# Patient Record
Sex: FEMALE | Race: WHITE | Employment: UNEMPLOYED | URBAN - METROPOLITAN AREA
[De-identification: names, ages, dates, MRNs, and addresses within clinical notes are randomized per-mention and may not be internally consistent; named-entity substitution may affect disease eponyms.]

---

## 2019-04-09 ENCOUNTER — HOSPITAL ENCOUNTER (OUTPATIENT)
Dept: LAB | Age: 34
Discharge: HOME | End: 2019-04-09
Payer: OTHER GOVERNMENT

## 2019-04-09 ENCOUNTER — OFFICE VISIT (OUTPATIENT)
Dept: FAMILY MEDICINE CLINIC | Age: 34
End: 2019-04-09

## 2019-04-09 VITALS
BODY MASS INDEX: 21.27 KG/M2 | HEART RATE: 61 BPM | TEMPERATURE: 98.7 F | DIASTOLIC BLOOD PRESSURE: 72 MMHG | WEIGHT: 124.6 LBS | SYSTOLIC BLOOD PRESSURE: 112 MMHG | OXYGEN SATURATION: 98 % | HEIGHT: 64 IN | RESPIRATION RATE: 16 BRPM

## 2019-04-09 DIAGNOSIS — Z31.9 INFERTILITY MANAGEMENT: ICD-10-CM

## 2019-04-09 DIAGNOSIS — Z00.00 ROUTINE GENERAL MEDICAL EXAMINATION AT A HEALTH CARE FACILITY: ICD-10-CM

## 2019-04-09 DIAGNOSIS — E03.9 ACQUIRED HYPOTHYROIDISM: Primary | ICD-10-CM

## 2019-04-09 DIAGNOSIS — E03.9 ACQUIRED HYPOTHYROIDISM: ICD-10-CM

## 2019-04-09 LAB
T4 FREE SERPL-MCNC: 0.8 NG/DL (ref 0.7–1.5)
TSH SERPL DL<=0.05 MIU/L-ACNC: 1.3 UIU/ML (ref 0.36–3.74)

## 2019-04-09 PROCEDURE — 80048 BASIC METABOLIC PNL TOTAL CA: CPT

## 2019-04-09 PROCEDURE — 86376 MICROSOMAL ANTIBODY EACH: CPT

## 2019-04-09 PROCEDURE — 84439 ASSAY OF FREE THYROXINE: CPT

## 2019-04-09 PROCEDURE — 36415 COLL VENOUS BLD VENIPUNCTURE: CPT

## 2019-04-09 PROCEDURE — 84443 ASSAY THYROID STIM HORMONE: CPT

## 2019-04-09 RX ORDER — LEVOTHYROXINE SODIUM 25 UG/1
TABLET ORAL
COMMUNITY
End: 2019-07-03 | Stop reason: ALTCHOICE

## 2019-04-09 NOTE — PATIENT INSTRUCTIONS
Hypothyroidism: Care Instructions  Your Care Instructions    You have hypothyroidism, which means that your body is not making enough thyroid hormone. This hormone helps your body use energy. If your thyroid level is low, you may feel tired, be constipated, have an increase in your blood pressure, or have dry skin or memory problems. You may also get cold easily, even when it is warm. Women with low thyroid levels may have heavy menstrual periods. A blood test to find your thyroid-stimulating hormone (TSH) level is used to check for hypothyroidism. A high TSH level may mean that you have low thyroid. When your body is not making enough thyroid hormone, TSH levels rise in an effort to make the body produce more. The treatment for hypothyroidism is to take thyroid hormone pills. You should start to feel better in 1 to 2 weeks. But it can take several months to see changes in the TSH level. You will need regular visits with your doctor to make sure you have the right dose of medicine. Most people need treatment for the rest of their lives. You will need to see your doctor regularly to have blood tests and to make sure you are doing well. Follow-up care is a key part of your treatment and safety. Be sure to make and go to all appointments, and call your doctor if you are having problems. It's also a good idea to know your test results and keep a list of the medicines you take. How can you care for yourself at home? · Take your thyroid hormone medicine exactly as prescribed. Call your doctor if you think you are having a problem with your medicine. Most people do not have side effects if they take the right amount of medicine regularly. ? Take the medicine 30 minutes before breakfast, and do not take it with calcium, vitamins, or iron. ? Do not take extra doses of your thyroid medicine. It will not help you get better any faster, and it may cause side effects.   ? If you forget to take a dose, do NOT take a double dose of medicine. Take your usual dose the next day. · Tell your doctor about all prescription, herbal, or over-the-counter products you take. · Take care of yourself. Eat a healthy diet, get enough sleep, and get regular exercise. When should you call for help? Call 911 anytime you think you may need emergency care. For example, call if:    · You passed out (lost consciousness).     · You have severe trouble breathing.     · You have a very slow heartbeat (less than 60 beats a minute).     · You have a low body temperature (95°F or below).    Call your doctor now or seek immediate medical care if:    · You feel tired, sluggish, or weak.     · You have trouble remembering things or concentrating.     · You do not begin to feel better 2 weeks after starting your medicine.    Watch closely for changes in your health, and be sure to contact your doctor if you have any problems. Where can you learn more? Go to http://taylor-sebas.info/. Enter M878 in the search box to learn more about \"Hypothyroidism: Care Instructions. \"  Current as of: March 14, 2018  Content Version: 11.9  © 0647-3582 [employer]. Care instructions adapted under license by [employer] (which disclaims liability or warranty for this information). If you have questions about a medical condition or this instruction, always ask your healthcare professional. [address] any warranty or liability for your use of this information.

## 2019-04-09 NOTE — PROGRESS NOTES
Mookie Sheriff is a 37 y.o. female (: 1985) presenting to address:    Chief Complaint   Patient presents with   [facility].     dx'd with hypothyroid while pregnant; has not seen PCP in 10 years       Vitals:    19 1427   BP: 112/72   Pulse: 61   Resp: 16   Temp: 98.7 °F (37.1 °C)   TempSrc: Oral   SpO2: 98%   Weight: 124 lb 9.6 oz (56.5 kg)   Height: 5' 4.37\" (1.635 m)   PainSc:   0 - No pain       Hearing/Vision:   No exam data present    Learning Assessment:     Learning Assessment 2019   PRIMARY LEARNER Patient   HIGHEST LEVEL OF EDUCATION - PRIMARY LEARNER  > 4 YEARS OF COLLEGE   BARRIERS PRIMARY LEARNER NONE   CO-LEARNER CAREGIVER No   PRIMARY LANGUAGE ENGLISH    NEED No   LEARNER PREFERENCE PRIMARY OTHER (COMMENT)   ANSWERED BY patient   RELATIONSHIP SELF     Depression Screening:     3 most recent PHQ Screens 2019   Little interest or pleasure in doing things Not at all   Feeling down, depressed, irritable, or hopeless Not at all   Total Score PHQ 2 0     Fall Risk Assessment:   No flowsheet data found. Abuse Screening:   No flowsheet data found. Coordination of Care Questionaire:   1. Have you been to the ER, urgent care clinic since your last visit? Hospitalized since your last visit? NO    2. Have you seen or consulted any other health care providers outside of the [address] [place] [address] since your last visit? Include any pap smears or colon screening. YES; ophthamology and endocrine    Advanced Directive:   1. Do you have an Advanced Directive? NO    2. Would you like information on Advanced Directives?  NO

## 2019-04-09 NOTE — PROGRESS NOTES
Subjective:      Patient : Radha Darnell is an 37 y.o.  female being seen today for follow up of hypothyroidism. The  patient is currently asymptomatic. Off meds since 2016. Wanting another child wondering if fertility concerns are linked to thyroid. She has been trying for 3-4 months and has not been tracking ovulation until this month. Cycles are regular, she states difficulty with first pregnancy but did achieve naturally. 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 Current Outpatient Medications   Medication Sig Dispense Refill    prenatal vits15/iron/folic/dss (PRENATAL AD PO) Take  by mouth.  calcium-cholecalciferol, d3, (CALCIUM 600 + D) 600-125 mg-unit tab Take  by mouth.  omega-3 fatty acids (FISH OIL CONCENTRATE PO) Take  by mouth as needed.  levothyroxine (SYNTHROID) 25 mcg tablet Take  by mouth Daily (before breakfast).        No Known Allergies  Social History     Socioeconomic History    Marital status:      Spouse name: Not on file    Number of children: Not on file    Years of education: Not on file    Highest education level: Not on file   Occupational History    Not on file   Social Needs    Financial resource strain: Not on file    Food insecurity:     Worry: Not on file     Inability: Not on file    Transportation needs:     Medical: Not on file     Non-medical: Not on file   Tobacco Use    Smoking status: Never Smoker    Smokeless tobacco: Never Used   Substance and Sexual Activity    Alcohol use: Yes     Alcohol/week: 0.6 oz     Types: 1 Glasses of wine per week     Frequency: Monthly or less     Drinks per session: 1 or 2     Binge frequency: Never    Drug use: Never    Sexual activity: Yes     Partners: Male     Birth control/protection: None   Lifestyle    Physical activity:     Days per week: Not on file     Minutes per session: Not on file    Stress: Not on file   Relationships    Social connections:     Talks on phone: Not on file     Gets together: Not on file     Attends Mosque service: Not on file     Active member of club or organization: Not on file     Attends meetings of clubs or organizations: Not on file     Relationship status: Not on file    Intimate partner violence:     Fear of current or ex partner: Not on file     Emotionally abused: Not on file     Physically abused: Not on file     Forced sexual activity: Not on file   Other Topics Concern    Not on file   Social History Narrative    Not on file       Assessment:     Hypothyroidism  Well visit    Plan:     Orders Placed This Encounter    TSH 3RD GENERATION     Standing Status:   Future     Standing Expiration Date:   4/9/2020    T4, FREE     Standing Status:   Future     Standing Expiration Date:   4/9/2020    CBC W/O DIFF     Standing Status:   Future     Standing Expiration Date:   0/4/1040    METABOLIC PANEL, BASIC     Standing Status:   Future     Standing Expiration Date:   4/9/2020    prenatal vits15/iron/folic/dss (PRENATAL AD PO)     Sig: Take  by mouth.  levothyroxine (SYNTHROID) 25 mcg tablet     Sig: Take  by mouth Daily (before breakfast).     calcium-cholecalciferol, d3, (CALCIUM 600 + D) 600-125 mg-unit tab     Sig: Take  by mouth.  omega-3 fatty acids (FISH OIL CONCENTRATE PO)     Sig: Take  by mouth as needed. Will call with lab results and any adjustments. Lengthy discussion on fertility associated with thyroid. Will continue to address once labs are back. She will also give it more time and continue to track ovulation. Good news is she has achieved pregnancy before with same partner. Also discussed this as a positive finding, reassurance provided very anxious about becoming pregnant before  deploys. See back in 3 months.

## 2019-04-10 LAB
ANION GAP SERPL CALC-SCNC: 11 MMOL/L (ref 3–18)
BUN SERPL-MCNC: 18 MG/DL (ref 7–18)
BUN/CREAT SERPL: 23 (ref 12–20)
CALCIUM SERPL-MCNC: 8.9 MG/DL (ref 8.5–10.1)
CHLORIDE SERPL-SCNC: 106 MMOL/L (ref 100–108)
CO2 SERPL-SCNC: 22 MMOL/L (ref 21–32)
CREAT SERPL-MCNC: 0.77 MG/DL (ref 0.6–1.3)
GLUCOSE SERPL-MCNC: 85 MG/DL (ref 74–99)
POTASSIUM SERPL-SCNC: 4.5 MMOL/L (ref 3.5–5.5)
SODIUM SERPL-SCNC: 139 MMOL/L (ref 136–145)

## 2019-04-11 LAB — THYROPEROXIDASE AB SERPL-ACNC: 21 IU/ML (ref 0–34)

## 2019-04-11 NOTE — PROGRESS NOTES
Thyroid levels look great not a factor at this time levels are very appropriate not to high or low. No med at this time continue plan discussed in OV and see back for additional testing as needed in 2 months.

## 2019-04-12 ENCOUNTER — TELEPHONE (OUTPATIENT)
Dept: FAMILY MEDICINE CLINIC | Age: 34
End: 2019-04-12

## 2019-04-12 NOTE — TELEPHONE ENCOUNTER
Further explanation of TPO antibody and tsh, t4 test. Patient satisfied with information and will rtc in 2-3 months if continued concern.

## 2019-05-22 ENCOUNTER — TELEPHONE (OUTPATIENT)
Dept: FAMILY MEDICINE CLINIC | Age: 34
End: 2019-05-22

## 2019-05-22 DIAGNOSIS — Z31.41 FERTILITY TESTING: Primary | ICD-10-CM

## 2019-05-22 NOTE — TELEPHONE ENCOUNTER
Patient called and stated she would like to have the blood work that was discussed at prior visit done before her appointment on Sonal 10 th. Patient stated her cycle is irregular and she is unable to get pregnant  She has been having her cycle every 3 weeks for the past 5 months.

## 2019-05-24 NOTE — TELEPHONE ENCOUNTER
These tests must be done on day 3 after cycle starts. So she should come in on day 3 of menses and we can call her with results.

## 2019-05-29 NOTE — TELEPHONE ENCOUNTER
Patient can transfer care to GYN at any time if she would like more done then we have accomplished. However all labs must be done during cycle to check levels.

## 2019-05-29 NOTE — TELEPHONE ENCOUNTER
Patient called and was very upset because she feels like her time is being wasted. She stated that she called on day one of her cycle and that there was plenty of time to come in on day 3 of her cycle. The patient continued to go in circles with the fact that she called on day 1 of her cycle, and that it was plenty of time for her to come in on day 3 of her cycle to get the labs done. The patient kept stating that she could not help that there was a family emergency for [facility] and that another doctor should have stepped in to take care of this for her. It is unclear if the patient actually stated that she was on the first day of her cycle or not because in the original telephone encounter it stated that she wanted to get her labs done before her appointment on the 10th. At every attempt to explain to the patient that [facility] has put the orders in and that she needs to come in on her 3rd cycle day, the patient continued to cry and make the same statement, and say that we are wasting her time. The patient finally stated that same things as mentioned above, and ended the call.

## 2019-06-10 ENCOUNTER — OFFICE VISIT (OUTPATIENT)
Dept: FAMILY MEDICINE CLINIC | Age: 34
End: 2019-06-10

## 2019-06-10 VITALS
WEIGHT: 124.8 LBS | BODY MASS INDEX: 21.31 KG/M2 | SYSTOLIC BLOOD PRESSURE: 106 MMHG | OXYGEN SATURATION: 99 % | HEIGHT: 64 IN | TEMPERATURE: 97.1 F | HEART RATE: 70 BPM | DIASTOLIC BLOOD PRESSURE: 64 MMHG | RESPIRATION RATE: 18 BRPM

## 2019-06-10 DIAGNOSIS — Z31.41 FERTILITY TESTING: ICD-10-CM

## 2019-06-10 DIAGNOSIS — Z00.00 ROUTINE GENERAL MEDICAL EXAMINATION AT A HEALTH CARE FACILITY: Primary | ICD-10-CM

## 2019-06-10 RX ORDER — CHOLECALCIFEROL (VITAMIN D3) 125 MCG
5000 CAPSULE ORAL DAILY
COMMUNITY
End: 2019-07-03 | Stop reason: DRUGHIGH

## 2019-06-10 NOTE — PATIENT INSTRUCTIONS

## 2019-06-10 NOTE — PROGRESS NOTES
Ben Campbell is a 37 y.o. female (: 1985) presenting to address:    Chief Complaint   Patient presents with   [address]     follow up       Vitals:    06/10/19 0807   BP: 106/64   Pulse: 70   Resp: 18   Temp: 97.1 °F (36.2 °C)   TempSrc: Oral   SpO2: 99%   Weight: 124 lb 12.8 oz (56.6 kg)   Height: 5' 4.37\" (1.635 m)   PainSc:   0 - No pain   LMP: 2019       Hearing/Vision:   No exam data present    Learning Assessment:     Learning Assessment 2019   PRIMARY LEARNER Patient   HIGHEST LEVEL OF EDUCATION - PRIMARY LEARNER  > 4 YEARS OF COLLEGE   BARRIERS PRIMARY LEARNER NONE   CO-LEARNER CAREGIVER No   PRIMARY LANGUAGE ENGLISH    NEED No   LEARNER PREFERENCE PRIMARY OTHER (COMMENT)   ANSWERED BY patient   RELATIONSHIP SELF     Depression Screening:     3 most recent PHQ Screens 6/10/2019   Little interest or pleasure in doing things Not at all   Feeling down, depressed, irritable, or hopeless Not at all   Total Score PHQ 2 0     Fall Risk Assessment:   No flowsheet data found. Abuse Screening:   No flowsheet data found. Coordination of Care Questionaire:   1. Have you been to the ER, urgent care clinic since your last visit? Hospitalized since your last visit? NO    2. Have you seen or consulted any other health care providers outside of the [address] [place] [address] since your last visit? Include any pap smears or colon screening. No    Advanced Directive:   1. Do you have an Advanced Directive? NO    2. Would you like information on Advanced Directives?  NO

## 2019-06-10 NOTE — PROGRESS NOTES
Subjective:   37 y.o. female for Well Woman Check. Her gyne and breast care is done elsewhere by her Ob-Gyne physician. Current Outpatient Medications   Medication Sig Dispense Refill    cholecalciferol (VITAMIN D3) 5,000 unit capsule Take 5,000 Units by mouth daily.  prenatal vits15/iron/folic/dss (PRENATAL AD PO) Take  by mouth.  calcium-cholecalciferol, d3, (CALCIUM 600 + D) 600-125 mg-unit tab Take  by mouth.  omega-3 fatty acids (FISH OIL CONCENTRATE PO) Take  by mouth as needed.  levothyroxine (SYNTHROID) 25 mcg tablet Take  by mouth Daily (before breakfast). No Known Allergies     Lab Results   Component Value Date/Time    TSH 1.30 04/09/2019 02:43 PM    T4, Free 0.8 04/09/2019 02:43 PM         Specific concerns today: continued questions about fertility. Review of Systems  A comprehensive review of systems was negative except for that written in the HPI. Objective:   Blood pressure 106/64, pulse 70, temperature 97.1 °F (36.2 °C), temperature source Oral, resp. rate 18, height 5' 4.37\" (1.635 m), weight 124 lb 12.8 oz (56.6 kg), last menstrual period 05/20/2019, SpO2 99 %.    Physical Examination:   General appearance - alert, well appearing, and in no distress  Mental status - alert, oriented to person, place, and time, normal mood, behavior, speech, dress, motor activity, and thought processes  Eyes - pupils equal and reactive, extraocular eye movements intact  Ears - bilateral TM's and external ear canals normal  Nose - normal and patent, no erythema, discharge or polyps  Mouth - mucous membranes moist, pharynx normal without lesions  Neck - supple, no significant adenopathy, thyroid exam: thyroid is normal in size without nodules or tenderness  Lymphatics - no palpable lymphadenopathy, no hepatosplenomegaly  Chest - clear to auscultation, no wheezes, rales or rhonchi, symmetric air entry  Heart - normal rate, regular rhythm, normal S1, S2, no murmurs, rubs, clicks or gallops  Abdomen - soft, nontender, nondistended, no masses or organomegaly  Neurological - alert, oriented, normal speech, no focal findings or movement disorder noted  Musculoskeletal - no joint tenderness, deformity or swelling  Extremities - peripheral pulses normal, no pedal edema, no clubbing or cyanosis  Skin - normal coloration and turgor, no rashes, no suspicious skin lesions noted     Assessment/Plan: For fertility need to await testing she will have labs done on 3rd day of cycle. continue present diet with no restrictions, continue present plan, routine labs ordered, call if any problems  Consider referral to fertility specialist dependent on patient preference and labs. Encounter Diagnoses   Name Primary?     Routine general medical examination at a health care facility Yes    Fertility testing      Orders Placed This Encounter    cholecalciferol (VITAMIN D3) 5,000 unit capsule

## 2019-06-19 ENCOUNTER — HOSPITAL ENCOUNTER (OUTPATIENT)
Dept: LAB | Age: 34
Discharge: HOME | End: 2019-06-19
Payer: OTHER GOVERNMENT

## 2019-06-19 DIAGNOSIS — E03.9 ACQUIRED HYPOTHYROIDISM: ICD-10-CM

## 2019-06-19 DIAGNOSIS — Z31.41 FERTILITY TESTING: ICD-10-CM

## 2019-06-19 DIAGNOSIS — Z00.00 ROUTINE GENERAL MEDICAL EXAMINATION AT A HEALTH CARE FACILITY: ICD-10-CM

## 2019-06-19 LAB
ANION GAP SERPL CALC-SCNC: 8 MMOL/L (ref 3–18)
BUN SERPL-MCNC: 22 MG/DL (ref 7–18)
BUN/CREAT SERPL: 27 (ref 12–20)
CALCIUM SERPL-MCNC: 9.2 MG/DL (ref 8.5–10.1)
CHLORIDE SERPL-SCNC: 104 MMOL/L (ref 100–108)
CO2 SERPL-SCNC: 27 MMOL/L (ref 21–32)
CREAT SERPL-MCNC: 0.81 MG/DL (ref 0.6–1.3)
ERYTHROCYTE [DISTWIDTH] IN BLOOD BY AUTOMATED COUNT: 13 % (ref 11.6–14.5)
GLUCOSE SERPL-MCNC: 91 MG/DL (ref 74–99)
HCT VFR BLD AUTO: 46.6 % (ref 35–45)
HGB BLD-MCNC: 15 G/DL (ref 12–16)
MCH RBC QN AUTO: 32.4 PG (ref 24–34)
MCHC RBC AUTO-ENTMCNC: 32.2 G/DL (ref 31–37)
MCV RBC AUTO: 100.6 FL (ref 74–97)
PLATELET # BLD AUTO: 267 K/UL (ref 135–420)
PMV BLD AUTO: 10.6 FL (ref 9.2–11.8)
POTASSIUM SERPL-SCNC: 4.8 MMOL/L (ref 3.5–5.5)
RBC # BLD AUTO: 4.63 M/UL (ref 4.2–5.3)
SODIUM SERPL-SCNC: 139 MMOL/L (ref 136–145)
WBC # BLD AUTO: 5.6 K/UL (ref 4.6–13.2)

## 2019-06-19 PROCEDURE — 85027 COMPLETE CBC AUTOMATED: CPT

## 2019-06-19 PROCEDURE — 86376 MICROSOMAL ANTIBODY EACH: CPT

## 2019-06-19 PROCEDURE — 82670 ASSAY OF TOTAL ESTRADIOL: CPT

## 2019-06-19 PROCEDURE — 80048 BASIC METABOLIC PNL TOTAL CA: CPT

## 2019-06-19 PROCEDURE — 83001 ASSAY OF GONADOTROPIN (FSH): CPT

## 2019-06-19 PROCEDURE — 36415 COLL VENOUS BLD VENIPUNCTURE: CPT

## 2019-06-20 LAB
FSH SERPL-ACNC: 5.3 MIU/ML
LH SERPL-ACNC: 6.4 MIU/ML

## 2019-06-21 LAB
ESTRADIOL SERPL-MCNC: 44.9 PG/ML
THYROPEROXIDASE AB SERPL-ACNC: 14 IU/ML (ref 0–34)

## 2019-06-25 NOTE — PROGRESS NOTES
Patient informed that labs are normal, she states everything is not normal since she is still not pregnant yet. patient would like referral for OB GYN & Fertility.

## 2019-06-26 ENCOUNTER — TELEPHONE (OUTPATIENT)
Dept: FAMILY MEDICINE CLINIC | Age: 34
End: 2019-06-26

## 2019-06-26 NOTE — TELEPHONE ENCOUNTER
Notes recorded by Vangie Calvin NP on 6/25/2019 at 3:00 PM EDT    Please call patient back and discuss the following. Vit D at 5000 is a very normal dose it is not to high and will not cause problems however if the patient is more comfortable she can not take it at all or take a lower dose 1-2000 units daily. The thyroid test was normal 2 months ago and so we specifically repeated TPO which was at patient request as normally we would not repeat these test for 1 year. It was also normal again. The bun/creat ratio and BUN are not considered abnormal as this is a mild elevation and not concerning for any type of kidney dysfunction in fact could just indicate mild dehydration and 2 months ago these were normal as well. The CBC was normal the slight elevation in Hematocrit at 46.6 is not of any concern and the MCV is elevated due to the hematocrits elevation. These numbers are more concerning when to low. These very slight elevations do not indicate any problems and in fact tell us that your RBC is working well. Per Up to Date guidelines which is a nationally recognized provider resource the starting point for fertility concerns is   to test ovarian reserve by doing cycle day 3 follicle-stimulating hormone (FSH) or estradiol. I also included repeat TPO and LH. Progesterone is not typically tested at the same time and is not commonly tested as a first line approach. In previous office visit I know we did discuss that we could help you start the process of checking basic labs and discussing your fertility needs which has been done. Since we have not identified any major concerns and all labs are normal it is best for you to proceed with GYN and fertility clinic. The goal here was to provide reassurance that you do not have any hormone issues causing problems with your fertility and we did that.  As we spoke about before typically for fertility you want to be trying for 1 full year 12 months before you worry about ant testing or further work up in this case because of the prior issue with thyroid it was determined that we would start by checking thyroid. However since then we have proceeded with other tests as well and repeated TPO. So at this point I will make the recommendation for referral to fertility and GYN they can continue to address your concerns and provide you with any additional testing they deem appropriate for your care.

## 2019-06-26 NOTE — TELEPHONE ENCOUNTER
Spoke with patient and informed of message from Nicolette Wood NP. Also informed her that I contacted [facility] and they do take  however  tends not to cover everything. Informed patient I would send over the referral along with notes and labs. Gave her the number as well to contact them along with the GYN group. I did update her family history as well.

## 2019-06-26 NOTE — TELEPHONE ENCOUNTER
Pt called regarding a call back from the nurse, the nurse had given her a call yesterday regarding a referral. She tried to call back yesterday but called back after 4:30pm so she was not able to speak with the nurse. Patient called again today on 6/26/2019, demanding to speak to the nurse since she had called multiple times and was not able to get through, I made her aware that the nurse was currently at lunch and I could send a message to the nurse on her behalf. She then started to yell, I asked her to lower her voice so we could have a civil conversation but she didn't listen. She then continued to yell demanding to be sent directly to [facility] phone, I told her that I would not be able to send her to the provider's phone but again I could send a message to the nurse on her behalf, that is when she ended the call. Shortly after, she called back again demanding to speak to Dorothy Scherer, and again I told her I would not be able to send her to the provider but I could send a message to the nurse on her behalf, she said okay and hung up.

## 2019-06-26 NOTE — TELEPHONE ENCOUNTER
Pt would like a call back from the nurse, she was returning the nurse's call regarding referral and her lab results.

## 2019-07-01 NOTE — PROGRESS NOTES
Made an attempt to contact patient, but then I realized that this is the patient that som already spoke with patient. I left that on her voicemail as well. Message just appeared in my box.

## 2019-07-03 ENCOUNTER — TELEPHONE (OUTPATIENT)
Dept: FAMILY MEDICINE CLINIC | Age: 34
End: 2019-07-03

## 2019-07-03 DIAGNOSIS — R79.89 ABNORMAL THYROID BLOOD TEST: ICD-10-CM

## 2019-07-03 DIAGNOSIS — Z31.9 INFERTILITY MANAGEMENT: Primary | ICD-10-CM

## 2019-07-03 RX ORDER — DIPHENHYDRAMINE HCL 25 MG
1 TABLET,DISINTEGRATING ORAL DAILY
COMMUNITY

## 2019-07-03 NOTE — TELEPHONE ENCOUNTER
Patient called and stated she was seen by Dr. Patience Peralta w/ [facility] on 7/1/19 and the labs came back with elevated TSH of 5.09 she stated that GYN recommends her being put on thyroid mediations however informed her that her PCP would need to do that, or she would need to see Endo. She states she would rather have Lianna Irby NP do it so she doesn't have to wait a month or more to see the endocrinologist.     Informed her I would call over to GYN office and request records however Gina Adrian is off today and we are closed Thursday so should would not receive a call back till Friday. Patient is ok with that. Called over [facility] and requested office note to be faxed over. Received notes and put on Lianna Irby NP desk to review. Pharmacy updated.

## 2019-07-05 NOTE — TELEPHONE ENCOUNTER
All of her previous thyroid screens were normal and her t3, t4, and thyroxine are all wnl. This is a mild elevation and prior studies showed she was almost supressed. Hormones do fluctuate daily and in cases like this we typically do not start medication we repeat test in 8 weeks. I have consulted with Dr Reji Molina as well and she agreed with original tx plan to not prescribe thyroid meds. I also reviewed information from GYN and it states they want patient to have all testing completed and bring her back in for consult and review they did not note that PCP would follow for thyroid. At this time I think it is in patients best interest to see endo. Lets try to get an appointment for her quickly and have her seen so they can decide if it is a good idea to restart this medicine given she has all normal results prior to this one tsh.

## 2019-07-05 NOTE — TELEPHONE ENCOUNTER
Called pt to go over pcp recommendations. Reached vm. Left msg for her to call back for pcp recommendations.

## 2019-07-08 NOTE — TELEPHONE ENCOUNTER
Patient called office x 3 times. Spoke with patient and informed her of message before I could even finish reading the message patient told me to stop and started yelling. She states our office lied and kept repeating her self over and over stating she has a short luteal phase  because of her abnormal thyroid and when we checked her thyroid level before we did it at the end of the day which is not correct it should be only done in the morning for the correct result. She states this is why she is going to continue to have miscarriages because of the luteal phase being off because of thyroid. Patient continued to yell and scream stating she could possibility be pregnant currently and most likely is going to miscarry because of this. I tried to calm her down many times. Informing her I have already received an approval from [facility]var[facility]aur for her to see an Endocrinology and I have also called around to many offices to find out who has the soonest appointment. Informed her that I spoke with [facility] and they can get her scheduled with a PA within a week or so once they received the notes. I gave the patient the phone number to call and told her that I tried to schedule the appointment however they prefer to speak with patient to schedule. She wanted to make sure I sent all previous notes from her old endocrinology Dr. Jackie Ortega in [place]. I informed her I do not have any notes from previous endo she state Kezia Currie informed her that she was pulling those and that's funny because [facility] already has them. Informed her I just tried to pull them electronically and was unable to do so and I don't see a signed release on file so I am unable to send to get records.     After ending call and informed patient to call Endo this afternoon or tomorrow morning to get scheduled I did call back to [facility] to see if they could fax me over the endo records from Dr. Jackie Ortega however they state they don't have those records either. Faxed [facility] fx # [phone] the notes/labs from GYN as well as our office.

## 2019-07-08 NOTE — TELEPHONE ENCOUNTER
Spoke to patient who was complaining  about her care with DUNCAN Norris. Patient states that she was promised that she would be able to receive care to get pregnant. Patient stated that she knew she had a history of hyperthyroidism and never once was told to see and Endocrinologist. Which is who managed her thyroid issue in the past. She is desperately trying to get pregnant before her  deploys in September and now she has wasted months due to her thyroid is out of wack. I apologized to the patient and explained that we would need to refer her out to endocrinologist who would be able to follow her more closely and it is their specialty. The patient then became very upset stating that she would be reporting DUNCAN Norris and Dr. Lillie Mendoaz to the medical board for not treating her. I again apologized to the patient and told her that we were able to call the endocrinologist office and they are able to get her in with in a week she just needs to call their office ~ [phone]~ the patient stated she heard it would be 8 weeks before she could get in. I told her no that Hailey Deluna spoke to them today and they could get her in sooner. Patient again stated that she would be reporting this to anyone who would listen and then hung up.

## 2020-12-11 ENCOUNTER — IMPORTED ENCOUNTER (OUTPATIENT)
Dept: URBAN - NONMETROPOLITAN AREA CLINIC 1 | Facility: CLINIC | Age: 35
End: 2020-12-11

## 2020-12-11 PROBLEM — H20.9: Noted: 2020-12-11

## 2020-12-11 PROCEDURE — 92002 INTRM OPH EXAM NEW PATIENT: CPT

## 2020-12-11 NOTE — PATIENT DISCUSSION
IritisDiscussed findings of exam in detail with the patient. Discussed the chronic nature of this disease and recurrent flare-ups. [place] [place] [place] to qid dilating drop tid-qid sample given if better monday decrease to bid

## 2022-04-09 ASSESSMENT — VISUAL ACUITY
OD_SC: 20/200
OS_SC: 20/20-1